# Patient Record
Sex: FEMALE | Race: WHITE | NOT HISPANIC OR LATINO | Employment: OTHER | URBAN - METROPOLITAN AREA
[De-identification: names, ages, dates, MRNs, and addresses within clinical notes are randomized per-mention and may not be internally consistent; named-entity substitution may affect disease eponyms.]

---

## 2023-08-07 ENCOUNTER — HOSPITAL ENCOUNTER (EMERGENCY)
Facility: HOSPITAL | Age: 68
Discharge: HOME | End: 2023-08-08
Attending: EMERGENCY MEDICINE | Admitting: EMERGENCY MEDICINE
Payer: COMMERCIAL

## 2023-08-07 ENCOUNTER — APPOINTMENT (OUTPATIENT)
Dept: RADIOLOGY | Facility: HOSPITAL | Age: 68
End: 2023-08-07
Attending: STUDENT IN AN ORGANIZED HEALTH CARE EDUCATION/TRAINING PROGRAM
Payer: COMMERCIAL

## 2023-08-07 DIAGNOSIS — S62.102A WRIST FRACTURE, LEFT, CLOSED, INITIAL ENCOUNTER: ICD-10-CM

## 2023-08-07 PROCEDURE — 25600 CLTX DST RDL FX/EPHYS SEP WO: CPT | Mod: LT

## 2023-08-07 PROCEDURE — 73110 X-RAY EXAM OF WRIST: CPT | Mod: LT

## 2023-08-07 PROCEDURE — 99284 EMERGENCY DEPT VISIT MOD MDM: CPT | Mod: 25

## 2023-08-07 RX ORDER — ACETAMINOPHEN 325 MG/1
650 TABLET ORAL ONCE
Status: COMPLETED | OUTPATIENT
Start: 2023-08-07 | End: 2023-08-07

## 2023-08-08 VITALS
WEIGHT: 145 LBS | BODY MASS INDEX: 24.75 KG/M2 | RESPIRATION RATE: 18 BRPM | DIASTOLIC BLOOD PRESSURE: 78 MMHG | OXYGEN SATURATION: 96 % | HEART RATE: 82 BPM | HEIGHT: 64 IN | SYSTOLIC BLOOD PRESSURE: 142 MMHG | TEMPERATURE: 98.9 F

## 2023-08-08 NOTE — ED TRIAGE NOTES
"At approximately 1930 patient tripped over her dog leash, fell and landed on left hand. Patient reports hearing a \"crunch,\" now has pain, edema to left wrist. Patient arrives in splint from home and ice applied.      Triage Assessment       Row Name 08/07/23 9019       Triage Assessment (Adult)    Airway WDL WDL       Respiratory WDL    Respiratory WDL WDL       Skin Circulation/Temperature WDL    Skin Circulation/Temperature WDL WDL       Cardiac WDL    Cardiac WDL WDL       Peripheral/Neurovascular WDL    Peripheral Neurovascular WDL WDL       Cognitive/Neuro/Behavioral WDL    Cognitive/Neuro/Behavioral WDL WDL                    "

## 2023-08-08 NOTE — ED PROVIDER NOTES
EMERGENCY DEPARTMENT ENCOUnter      NAME: Dave Morris  AGE: 67 year old female  YOB: 1955  MRN: [id number]  EVALUATION DATE & TIME: 2023 11:46 PM    PCP: Nallely Kang    ED PROVIDER: Matty Ramos DO      Chief Complaint   Patient presents with    Wrist Pain         FINAL IMPRESSION:  1. Wrist fracture, left, closed, initial encounter          ED COURSE & MEDICAL DECISION MAKIN:05 AM I introduced myself to the patient, obtained patient history, performed a physical exam, and discussed plan for ED workup including potential diagnostic laboratory/imaging studies and interventions.   12:15 AM splint placed  12:20 AM Rechecked and updated the patient. We discussed the plan for discharge and the patient is agreeable. Reviewed supportive cares, symptomatic treatment, outpatient follow up, and reasons to return to the Emergency Department. Patient to be discharged by ED RN.        The patient presented to the emergency department today after injuring her left wrist from falling over a dog leash.  She was found to have a left distal radius fracture.  She was placed in a plaster splint and has been given outpatient follow-up instructions for orthopedics.  She has also been instructed to return for any worsening symptoms or other concerns.  No other traumatic injury or other concerns.  She is comfortable with the plan for discharge.        Medical Decision Making    History:  Supplemental history from: Documented in chart, if applicable  External Record(s) reviewed: Documented in chart, if applicable.    Work Up:  Chart documentation includes differential considered and any EKGs or imaging independently interpreted by provider, where specified.  In additional to work up documented, I considered the following work up: Documented in chart, if applicable.    External consultation:  Discussion of management with another provider: Documented in chart, if applicable    Complicating  "factors:  Care impacted by chronic illness: N/A  Care affected by social determinants of health: N/A    Disposition considerations: Discharge. No recommendations on prescription strength medication(s). See documentation for any additional details.        At the conclusion of the encounter I discussed the results of all of the tests and the disposition. The questions were answered. The patient or family acknowledged understanding and was agreeable with the care plan.         MEDICATIONS GIVEN IN THE EMERGENCY:  Medications   acetaminophen (TYLENOL) tablet 650 mg (650 mg Oral Not Given 8/7/23 2134)       =================================================================    HPI        Dave Morris is a 67 year old female with no pertinent history who presents to this ED walk in with spouse for evaluation of wrist pain     The patient reports that they were out with the dog that is on a wire chain at 7:30 PM. The dog took off and that wire tripped her. She states that it happened to fast that the events are kind of a blur. She endorses left wrist pain. Denies any other injury or concerns at this time       PAST MEDICAL HISTORY:  No past medical history on file.    PAST SURGICAL HISTORY:  No past surgical history on file.        CURRENT MEDICATIONS:    No current outpatient medications on file.      ALLERGIES:  No Known Allergies    FAMILY HISTORY:  No family history on file.    SOCIAL HISTORY:   Social History     Socioeconomic History    Marital status:        VITALS:  Patient Vitals for the past 24 hrs:   BP Temp Temp src Pulse Resp SpO2 Height Weight   08/07/23 2358 (!) 142/78 -- -- -- -- -- -- --   08/07/23 2130 (!) 161/81 98.9  F (37.2  C) Oral 82 18 96 % 1.626 m (5' 4\") 65.8 kg (145 lb)       PHYSICAL EXAM    VITAL SIGNS: BP (!) 142/78   Pulse 82   Temp 98.9  F (37.2  C) (Oral)   Resp 18   Ht 1.626 m (5' 4\")   Wt 65.8 kg (145 lb)   SpO2 96%   BMI 24.89 kg/m     Constitutional:  Well developed, Well " nourished,  HENT:  Normocephalic, Atraumatic, Oropharynx moist, Nose normal.   Neck:  Normal range of motion, Supple, No stridor.   Eyes:  EOMI, Conjunctiva normal, No discharge.   Respiratory:  Breathing comfortably, No respiratory distress,    Cardiovascular:  Normal pulses   Musculoskeletal: Limited range of motion of left wrist due to pain, mild swelling and tenderness over dorsum of right wrist. No edema or cyanosis, no deformities  Integument:  Dry, No erythema, No rash.  Neurologic:  Alert & oriented x 3, No obvious focal deficits noted.   Psychiatric:  Affect normal, Judgment normal, Mood normal.           RADIOLOGY:  I have independently reviewed and interpreted the above imaging, pending the final radiology read.  XR Wrist Left G/E 3 Views   Final Result   IMPRESSION: There is an acute, mildly displaced and angulated intra-articular fracture of the distal left radius. Associated dorsal tilt of the distal radial articular surface.       Diffuse left wrist soft tissue swelling.      Predominantly mild polyarticular osteoarthritis, greatest at the basal thumb joint and triscaphe joint.            Procedure:     PROCEDURE: Splint Placement   INDICATIONS: left wrist fracture   PROCEDURE PROVIDER: Dr Matty Ramos   NOTE:  A Sugar tong splint made of Plaster was applied to the Left upper extremity by the above provider. As noted in the physical exam, distal CMS was intact prior to placement. The splint was checked and the fit was adjusted to ensure proper positioning after placement. Sensation and circulation, as well as motor function, are unchanged after splint placement and the patient is more comfortable with the splint in place.        I, Aure Holland, am serving as a scribe to document services personally performed by Dr. Ramos based on my observation and the provider's statements to me. I, Matty Ramos, DO attest that Aure Holland is acting in a scribe capacity, has observed my performance of the  services and has documented them in accordance with my direction.    Matty Ramos DO  Emergency Medicine  [facility] EMERGENCY DEPARTMENT  [facility] [facility] [id number] [phone]  Dept: [phone]     Matty Ramos MD  08/08/23 0127

## 2023-08-08 NOTE — DISCHARGE INSTRUCTIONS
You were found to have a wrist fracture today.  A splint was applied.  Keep this in place and follow-up closely with orthopedics as an outpatient.  Return to the emergency department for any worsening symptoms or other concerns.